# Patient Record
Sex: FEMALE
[De-identification: names, ages, dates, MRNs, and addresses within clinical notes are randomized per-mention and may not be internally consistent; named-entity substitution may affect disease eponyms.]

---

## 2020-10-30 PROBLEM — Z00.00 ENCOUNTER FOR PREVENTIVE HEALTH EXAMINATION: Status: ACTIVE | Noted: 2020-10-30

## 2020-11-05 ENCOUNTER — APPOINTMENT (OUTPATIENT)
Dept: ENDOCRINOLOGY | Facility: CLINIC | Age: 45
End: 2020-11-05
Payer: MEDICAID

## 2020-11-05 VITALS
DIASTOLIC BLOOD PRESSURE: 60 MMHG | OXYGEN SATURATION: 98 % | SYSTOLIC BLOOD PRESSURE: 110 MMHG | HEIGHT: 67 IN | WEIGHT: 150 LBS | BODY MASS INDEX: 23.54 KG/M2 | HEART RATE: 112 BPM

## 2020-11-05 PROCEDURE — 99072 ADDL SUPL MATRL&STAF TM PHE: CPT

## 2020-11-05 PROCEDURE — 99204 OFFICE O/P NEW MOD 45 MIN: CPT

## 2020-11-05 NOTE — HISTORY OF PRESENT ILLNESS
[FreeTextEntry1] : Ms. AMADA CASTILLO is 45 year female  who  presents with concerns of thyroid nodule \par Thyroid nodule was found on clinical exam approximately 7 years back.  She had a biopsy long time back and it was negative.  Patient denies any complaints with this nodule but had a repeat ultrasound recently there is no prior report for comparison.\par h/o rapid enlargement of thyroid mass - none\par any h/o prolonged cough - none\par h/o difficulty in breathing -none\par clinical symptoms of \par change in voice -no \par problems in swallowing solids -no \par problems in drinking -no \par Prior h/o radiation exposure to head or neck -no \par h/o exposure to nuclear accident -no \par family h/o thyroid cancer -no \par prior h/o thyroid surgery -no \par prior h/o POP treatment -no \par prior h/o thyroid medications -no \par prior h/o thyroid disorders. -no \par \par USG performed -yes \par TSH checked -no \par \par \par

## 2020-11-09 LAB
T3FREE SERPL-MCNC: 2.8 PG/ML
T4 FREE SERPL-MCNC: 1.1 NG/DL
THYROGLOB AB SERPL-ACNC: <20 IU/ML
THYROPEROXIDASE AB SERPL IA-ACNC: <10 IU/ML
TSH SERPL-ACNC: 1.22 UIU/ML

## 2021-01-08 ENCOUNTER — APPOINTMENT (OUTPATIENT)
Dept: ENDOCRINOLOGY | Facility: CLINIC | Age: 46
End: 2021-01-08
Payer: MEDICAID

## 2021-01-08 VITALS
OXYGEN SATURATION: 100 % | BODY MASS INDEX: 24.96 KG/M2 | HEIGHT: 67 IN | HEART RATE: 105 BPM | SYSTOLIC BLOOD PRESSURE: 126 MMHG | TEMPERATURE: 98.4 F | DIASTOLIC BLOOD PRESSURE: 80 MMHG | WEIGHT: 159 LBS

## 2021-01-08 DIAGNOSIS — E04.1 NONTOXIC SINGLE THYROID NODULE: ICD-10-CM

## 2021-01-08 PROCEDURE — 99214 OFFICE O/P EST MOD 30 MIN: CPT | Mod: 25

## 2021-01-08 PROCEDURE — 99072 ADDL SUPL MATRL&STAF TM PHE: CPT

## 2021-01-08 PROCEDURE — 10005 FNA BX W/US GDN 1ST LES: CPT

## 2021-01-08 RX ORDER — GABAPENTIN 600 MG/1
600 TABLET, COATED ORAL 4 TIMES DAILY
Refills: 0 | Status: ACTIVE | COMMUNITY

## 2021-01-08 RX ORDER — DULOXETINE HYDROCHLORIDE 60 MG/1
60 CAPSULE, DELAYED RELEASE ORAL
Refills: 0 | Status: ACTIVE | COMMUNITY

## 2021-01-08 RX ORDER — CLONAZEPAM 0.5 MG/1
0.5 TABLET ORAL 3 TIMES DAILY
Refills: 0 | Status: ACTIVE | COMMUNITY

## 2021-01-08 RX ORDER — DULOXETINE HYDROCHLORIDE 30 MG/1
30 CAPSULE, DELAYED RELEASE ORAL
Refills: 0 | Status: ACTIVE | COMMUNITY

## 2021-01-08 RX ORDER — HYDROXYZINE HYDROCHLORIDE 50 MG/1
50 TABLET ORAL TWICE DAILY
Refills: 0 | Status: ACTIVE | COMMUNITY

## 2021-01-08 NOTE — HISTORY OF PRESENT ILLNESS
[FreeTextEntry1] : Ms. AMADA CASTILLO  returns for thyroid nodule biopsy , apprx 3.8 cm in size. \par We went over the details of the procedure in the language Patient  understands. \par Discussed rare but serious compliant like infection and bleeding. \par There is also small risk of injury to the other anatomical structures in the vicinity like blood vessels, nerves, tracheal puncture etc.\par Risk of biopsy being being non  diagnostic and need for repeat biopsy. And appropriate use of genetic panel as indicated - All this was discussed in great detail.\par

## 2021-01-08 NOTE — PROCEDURE
[Fine Needle Aspiration] : Fine needle aspiration ~T ~C was performed. [Area of Mass: ______] : mass identified in the [unfilled] [Risks] : risks [Benefits] : benefits [Alternatives] : alternatives [Consent Obtained] : Written consent was obtained prior to the procedure and is detailed in the patient's record [Patient] : the patient [Ethyl Chloride] : ethyl chloride [Supine] : The patient was placed in the supine position with the neck extended as tolerated. [Alcohol] : with alcohol [27 gauge 1.5 inch] : A 27 gauge 1.5 inch needle was used [3 Passes] : 3 passes were made through the mass [Ultrasonic Guidance] : ultrasound guidance was employed [Sent to Histology] : The specimens were prepared in the usual manner and sent to histology. [Thyroseq] : Thyroseq [Tolerated Well] : the patient tolerated the procedure well [Vital Signs Stable] : the vital signs were stable [Hemostasis] : hemostasis was assured and the patient was discharged in satisfactory condition [No Complications] : There were no complications [Instructions Given] : Handouts/patient instructions were given to patient [PRN] : as needed. [de-identified] : 4.1 x 3.7x1.5 cm

## 2021-01-11 LAB — FNA, THYROID: NORMAL
